# Patient Record
Sex: FEMALE | Race: WHITE | NOT HISPANIC OR LATINO | URBAN - METROPOLITAN AREA
[De-identification: names, ages, dates, MRNs, and addresses within clinical notes are randomized per-mention and may not be internally consistent; named-entity substitution may affect disease eponyms.]

---

## 2017-05-08 ENCOUNTER — HISTORICAL (OUTPATIENT)
Dept: ADMINISTRATIVE | Facility: HOSPITAL | Age: 52
End: 2017-05-08

## 2017-05-08 LAB — B-HCG SERPL QL: NEGATIVE

## 2017-07-31 ENCOUNTER — HISTORICAL (OUTPATIENT)
Dept: ADMINISTRATIVE | Facility: HOSPITAL | Age: 52
End: 2017-07-31

## 2017-07-31 LAB — B-HCG SERPL QL: NEGATIVE

## 2021-10-18 ENCOUNTER — OFFICE VISIT (OUTPATIENT)
Dept: UROLOGY | Facility: CLINIC | Age: 56
End: 2021-10-18
Payer: MEDICARE

## 2021-10-18 VITALS
BODY MASS INDEX: 45.36 KG/M2 | HEART RATE: 85 BPM | DIASTOLIC BLOOD PRESSURE: 57 MMHG | WEIGHT: 256 LBS | HEIGHT: 63 IN | RESPIRATION RATE: 18 BRPM | SYSTOLIC BLOOD PRESSURE: 117 MMHG

## 2021-10-18 DIAGNOSIS — N39.41 URGE INCONTINENCE: Primary | ICD-10-CM

## 2021-10-18 DIAGNOSIS — N39.3 STRESS INCONTINENCE: ICD-10-CM

## 2021-10-18 PROCEDURE — 51798 PR MEAS,POST-VOID RES,US,NON-IMAGING: ICD-10-PCS | Mod: S$GLB,,, | Performed by: NURSE PRACTITIONER

## 2021-10-18 PROCEDURE — 51798 US URINE CAPACITY MEASURE: CPT | Mod: S$GLB,,, | Performed by: NURSE PRACTITIONER

## 2021-10-18 PROCEDURE — 99204 OFFICE O/P NEW MOD 45 MIN: CPT | Mod: S$GLB,,, | Performed by: NURSE PRACTITIONER

## 2021-10-18 PROCEDURE — 99204 PR OFFICE/OUTPT VISIT, NEW, LEVL IV, 45-59 MIN: ICD-10-PCS | Mod: S$GLB,,, | Performed by: NURSE PRACTITIONER

## 2021-10-18 RX ORDER — OMEPRAZOLE 40 MG/1
CAPSULE, DELAYED RELEASE ORAL
COMMUNITY
Start: 2021-08-19

## 2021-10-18 RX ORDER — FUROSEMIDE 20 MG/1
TABLET ORAL
COMMUNITY
Start: 2021-09-20

## 2021-10-18 RX ORDER — MIRABEGRON 50 MG/1
TABLET, FILM COATED, EXTENDED RELEASE ORAL
COMMUNITY
Start: 2021-10-15 | End: 2021-10-18

## 2021-10-18 RX ORDER — LINACLOTIDE 145 UG/1
CAPSULE, GELATIN COATED ORAL
COMMUNITY
Start: 2021-09-24

## 2021-10-18 RX ORDER — CYCLOBENZAPRINE HCL 10 MG
TABLET ORAL
COMMUNITY
Start: 2021-09-20

## 2021-10-18 RX ORDER — GABAPENTIN 100 MG/1
CAPSULE ORAL
COMMUNITY
Start: 2021-09-24

## 2021-10-18 RX ORDER — GABAPENTIN 600 MG/1
TABLET ORAL
COMMUNITY
Start: 2021-09-20

## 2021-10-18 RX ORDER — CITALOPRAM 40 MG/1
TABLET, FILM COATED ORAL
COMMUNITY
Start: 2021-05-18

## 2021-10-18 RX ORDER — PRAVASTATIN SODIUM 80 MG/1
TABLET ORAL
COMMUNITY
Start: 2021-10-08

## 2021-10-18 RX ORDER — LISINOPRIL 20 MG/1
TABLET ORAL
COMMUNITY
Start: 2021-09-24

## 2021-10-18 RX ORDER — CALCITRIOL 0.5 UG/1
CAPSULE ORAL
COMMUNITY
Start: 2021-10-08

## 2021-10-18 RX ORDER — TRAZODONE HYDROCHLORIDE 50 MG/1
TABLET ORAL
COMMUNITY
Start: 2021-08-19

## 2021-10-29 ENCOUNTER — TELEPHONE (OUTPATIENT)
Dept: UROLOGY | Facility: CLINIC | Age: 56
End: 2021-10-29
Payer: MEDICARE

## 2021-10-29 DIAGNOSIS — N39.41 URGE INCONTINENCE: ICD-10-CM

## 2021-11-08 ENCOUNTER — TELEPHONE (OUTPATIENT)
Dept: UROLOGY | Facility: CLINIC | Age: 56
End: 2021-11-08
Payer: MEDICARE

## 2021-11-09 ENCOUNTER — PROCEDURE VISIT (OUTPATIENT)
Dept: UROLOGY | Facility: CLINIC | Age: 56
End: 2021-11-09
Payer: MEDICARE

## 2021-11-09 VITALS
HEART RATE: 74 BPM | HEIGHT: 63 IN | OXYGEN SATURATION: 98 % | BODY MASS INDEX: 46.6 KG/M2 | WEIGHT: 263 LBS | DIASTOLIC BLOOD PRESSURE: 62 MMHG | SYSTOLIC BLOOD PRESSURE: 115 MMHG | RESPIRATION RATE: 20 BRPM

## 2021-11-09 DIAGNOSIS — N39.3 STRESS INCONTINENCE: ICD-10-CM

## 2021-11-09 PROCEDURE — 52000 CYSTOSCOPY: ICD-10-PCS | Mod: S$GLB,,, | Performed by: UROLOGY

## 2021-11-09 PROCEDURE — 52000 CYSTOURETHROSCOPY: CPT | Mod: S$GLB,,, | Performed by: UROLOGY

## 2022-04-30 NOTE — CONSULTS
DATE OF CONSULTATION:  05/08/2017    ATTENDING PHYSICIAN:  Dr. Anisa Miles MD  CONSULTING PHYSICIAN:  Anisa Miles MD    This is a 51-year-old female with past medical history of arthritis and history of anxiety who was scheduled for upper endoscopy with possible dilation today. The patient reports occasional heartburn.  She also reports intermittent dysphagia for the last year or so, somewhat worse in the last couple of months.  She reports issues both with solids and liquids.  She has occasional coughing and choking sensation.  She recently underwent modified barium swallow, which was unremarkable.  She was reported to have globus sensation in throat on occasional basis, but no significant abnormality noted.  Dietary modification was recommended.  The patient has occasional constipation.    PAST MEDICAL & SURGICAL HISTORY:  As above.    ALLERGIES:  PENICILLIN AND SULFAMETHOXAZOLE.    MEDICATIONS:  Citalopram, Meloxicam, etc.    FAMILY HISTORY:  Mother had colon polyps.  Family history of diabetes mellitus.  There is also family history of diverticulosis of the colon and lungs.    REVIEW OF SYSTEMS:  Appetite is okay.  No weight loss.  No fever or chills.  No significant sinus complaints.  No significant cardiopulmonary symptoms.  She has occasional heartburn and occasional constipation.  She has history of belching and flatulence.  Occasional small amount of blood on wiping.  Oropharyngeal dysphagia as above.  History of occasional bloating.  No significant urinary complaints.    PHYSICAL EXAMINATION:  VITAL SIGNS:  Stable, afebrile.     HEENT:  Unremarkable.     CHEST:  Clear to auscultation.   HEART:  S1 and S2 audible, regular.   ABDOMEN:  Bowel sounds are positive, soft and nontender.     EXTREMITIES:  No edema.      ASSESSMENT & PLAN:  This is a 51-year-old female with past medical history of multiple medical problems scheduled for EGD with possible dilation.  Procedure has been discussed with the patient in  detail.  She agrees and would like to proceed.        ______________________________  MD FANTA Chaudhari  DD:  05/08/2017  Time:  09:05AM  DT:  05/08/2017  Time:  10:54AM  Job #:  [id number]

## 2022-04-30 NOTE — OP NOTE
Patient:   Zuleima Read             MRN: [id number]            FIN: [id number]               Age:   51 years     Sex:  Female     :  1965   Associated Diagnoses:   None   Author:   Anisa Miles MD      Operative Note   Operative Information   Date/ Time:  2017 08:27:00.     Procedures Performed: Colonoscopy with biopsy.     Preoperative Diagnosis: Screening for colon cancer.     Postoperative Diagnosis: 1.  Prominent fold, cecal base.  Multiple biopsies/SPOT done  2.  Multiple diverticula, left coloN (especially sigmoid colon).  2.  Internal hemorrhoids, grade 2.  Preparation was fair to poor..     Surgeon: Anisa Miles MD.     Assistant: Gorge GARCIAS, Eligio Steinberg W.     Anesthesia: Per anesthesia service.     Speciman Removed: Yes.     Esimated blood loss: loss  5  cc.     Description of Procedure/Findings/    Complications: History and physical as per preoperative note.  Informed consent obtained.  Patient was placed in left lateral position.  Sedation per anesthesia service.  Rectal exam was unremarkable.  Olympus video colonoscope was introduced into the rectum and was carefully advanced to cecum.  Quality of the prep was fair to poor limiting visualization of the entire mucosa.  Periodic irrigation was done throughout the procedure for better visualization.  Some parts of the colon could not be completely visualized because of suboptimal preparation..     Findings: There was a prominent fold noted at the base of cecum behind ileocecal valve.  Multiple biopsies were done to rule out any adenomatous change.  Spot was placed  as well.  Terminal ileum was intubated during procedure and appeared to be unremarkable.  Ascending and transverse colon appeared unremarkable to the extent of visualization.  Prep was suboptimal as mentioned above limiting visualization of the entire mucosa.  There were multiple diverticula noted in the left colon especially in the region of the sigmoid colon.  There  were grade 2 internal hemorrhoids noted on retroflexion in the rectum.  Rectal mucosa otherwise appeared unremarkable.  Estimated withdrawal time from cecum to rectum was 12 minutes 18 seconds.  .     Complications: None.     Recommendations:  1.  Follow biopsy results.  If cecal biopsies have adenomatous change, repeat colonoscopy in 3-6 months with a better preparation.  Otherwise repeat exam in 2-3 years.    .

## 2022-04-30 NOTE — OP NOTE
Patient:   Zuleima Read             MRN: [id number]            FIN: [id number]               Age:   51 years     Sex:  Female     :  1965   Associated Diagnoses:   None   Author:   Anisa Miles MD      Operative Note   Operative Information   Date/ Time:  2017 09:13:00.     Preoperative Diagnosis: Dysphagia, intermittent.     Surgeon: Anisa Miles MD.     Assistant: Chandra GARCIAS, Gorge Cutler S: ARYA Del Valle     Anesthesia: Per anesthesia service.     Speciman Removed: Yes.     Description of Procedure/Findings/    Complications: History and as per preoperative note.  Informed consent was obtained.  Patient was placed in left lateral position.  Sedation per anesthesia service.  The Olympus video gastroscope was introduced into the oral cavity and esophagus was intubated under direct visualization.  Scope was carefully advanced to distal duodenum.  Patient tolerated procedure well without any complications.     Esimated blood loss: loss  3  cc.     Findings: Esophagus: Normal mucosa.  There was some resistance felt on initial introduction of the scope via upper esophageal sphincter: Savory dilation was done with 16 mm dilator at the end of procedure.  Stomach: Patchy erythema noted in the fundus and body with scattered erosions.  There was diffuse erythema with small superficial 4-5 mm ulceration noted in antrum.  CLOtest was done.  Duodenum: There was moderate erythema with scattered erosions noted in duodenal bulb.  2nd and 3rd portion of the duodenum appeared normal to the extent of exam.  The scope was withdrawn back into the stomach.  Spring-tip guidewire was passed via the scope.  The scope was exchanged over the wire and was replaced with a 16 mm dilator.  Scope was reintroduced after the dilation.  There seemed to be significantly improved resistance around upper esophageal sphincter after dilation..     Complications: None.     Recommendations:   1.  CLOtest results.    2.  Avoid  NSAIDs.  3.  PPI as directed.  4.  Follow up with speech therapy instruction regarding dysphagia.  5.  Patient to follow with me as scheduled..